# Patient Record
Sex: FEMALE | Race: WHITE | NOT HISPANIC OR LATINO | Employment: UNEMPLOYED | URBAN - METROPOLITAN AREA
[De-identification: names, ages, dates, MRNs, and addresses within clinical notes are randomized per-mention and may not be internally consistent; named-entity substitution may affect disease eponyms.]

---
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
## 2022-03-24 NOTE — PROGRESS NOTES
SUBJECTIVE:                                                    Juanita Borja is a 16 year old female who presents to clinic today for the following health issues:      URINARY TRACT SYMPTOMS     Onset: x 5 days    Description:   Painful urination (Dysuria): YES  Blood in urine (Hematuria): no   Delay in urine (Hesitency): YES    Intensity: mild    Progression of Symptoms:  worsening    Accompanying Signs & Symptoms:  Fever/chills: YES- chills  Flank pain YES  Nausea and vomiting: no   Any vaginal symptoms: none  Abdominal/Pelvic Pain: YES   History:   History of frequent UTI's: YES  History of kidney stones: no   Sexually Active: YES  Possibility of pregnancy: No    Precipitating factors:   Took Aleve today.         Therapies Tried and outcome: Aleve, Cranberry juice prn (contraindicated in Coumadin patients) and Increase fluid intake    ADDITIONAL HPI: 16 year old female here for above issue.     ROS: 10 point review of systems negative except as per HPI.    PAST MEDICAL HISTORY:  Past Medical History:   Diagnosis Date     ADHD (attention deficit hyperactivity disorder)      Anorexia      Bulimia      Depression 8/14/2015     Uncomplicated asthma     exercise-induced, uses albuterol prn        ACTIVE MEDICAL PROBLEMS:  Patient Active Problem List   Diagnosis     Suicidal ideation     Adjustment disorder of adolescence     Depression        FAMILY HISTORY:  History reviewed. No pertinent family history.    SOCIAL HISTORY:  Social History     Social History     Marital status: Single     Spouse name: N/A     Number of children: N/A     Years of education: N/A     Occupational History     Not on file.     Social History Main Topics     Smoking status: Current Some Day Smoker     Packs/day: 0.01     Years: 2.00     Types: Cigarettes     Smokeless tobacco: Never Used     Alcohol use Yes      Comment: occasionally     Drug use: Yes     Special: Marijuana     Sexual activity: Yes     Partners: Male     Birth  "control/ protection: Implant     Other Topics Concern     Not on file     Social History Narrative       MEDICATIONS:  Current Outpatient Prescriptions   Medication Sig Dispense Refill     ciprofloxacin (CIPRO) 500 MG tablet Take 1 tablet (500 mg) by mouth 2 times daily for 5 days 10 tablet 0     ARIPiprazole (ABILIFY) 2 MG tablet Take 2 mg by mouth daily       Amphetamine-Dextroamphetamine (ADDERALL XR PO) Take 10 mg by mouth       MELATONIN PO Take 3 mg by mouth At Bedtime       albuterol (PROAIR HFA, PROVENTIL HFA, VENTOLIN HFA) 108 (90 BASE) MCG/ACT inhaler Inhale 2 puffs into the lungs every 6 hours as needed for shortness of breath / dyspnea or wheezing       Levonorgestrel 13.5 MG IUD          ALLERGIES:     Allergies   Allergen Reactions     Sulfa Drugs      Cheeks got puffy and red, rash       Problem list, Medication list, Allergies, and Medical/Social/Surgical histories reviewed in EPIC and updated as appropriate.    OBJECTIVE:                                                    VITALS: /65 (BP Location: Right arm, Patient Position: Chair, Cuff Size: Adult Regular)  Pulse 89  Temp 97.9  F (36.6  C) (Tympanic)  Ht 5' 4\" (1.626 m)  Wt 108 lb 6.4 oz (49.2 kg)  BMI 18.61 kg/m2 Body mass index is 18.61 kg/(m^2).  GENERAL: Pleasant, well appearing female.  ABDOMEN: Soft, nondistended, nontender, no hepatosplenomegaly. No palpable masses.  BACK: No CVA tenderness to percussion.     Results for orders placed or performed in visit on 06/07/17   UA reflex to Microscopic and Culture   Result Value Ref Range    Color Urine Yellow     Appearance Urine Clear     Glucose Urine Negative NEG mg/dL    Bilirubin Urine Negative NEG    Ketones Urine Negative NEG mg/dL    Specific Gravity Urine 1.015 1.003 - 1.035    Blood Urine Trace (A) NEG    pH Urine 7.5 (H) 5.0 - 7.0 pH    Protein Albumin Urine 100 (A) NEG mg/dL    Urobilinogen Urine 0.2 0.2 - 1.0 EU/dL    Nitrite Urine Negative NEG    Leukocyte Esterase Urine " Small (A) NEG    Source Midstream Urine    Urine Microscopic   Result Value Ref Range    WBC Urine 5-10 (A) 0 - 2 /HPF    RBC Urine 2-5 (A) 0 - 2 /HPF    Squamous Epithelial /LPF Urine Moderate (A) FEW /LPF    Bacteria Urine Moderate (A) NEG /HPF        ASSESSMENT/PLAN:                                                    1. Acute cystitis with hematuria  - ciprofloxacin (CIPRO) 500 MG tablet; Take 1 tablet (500 mg) by mouth 2 times daily for 5 days  Dispense: 10 tablet; Refill: 0    2. Screen for STD (sexually transmitted disease)  - Neisseria gonorrhoeae PCR  - Chlamydia trachomatis PCR  - Urine Microscopic    3. UTI symptoms  - UA reflex to Microscopic and Culture             left ureteroscopy lithotripsy